# Patient Record
Sex: MALE | Race: BLACK OR AFRICAN AMERICAN | NOT HISPANIC OR LATINO | Employment: UNEMPLOYED | URBAN - METROPOLITAN AREA
[De-identification: names, ages, dates, MRNs, and addresses within clinical notes are randomized per-mention and may not be internally consistent; named-entity substitution may affect disease eponyms.]

---

## 2018-02-01 ENCOUNTER — ANESTHESIA EVENT (OUTPATIENT)
Dept: SURGERY | Facility: OTHER | Age: 53
End: 2018-02-01

## 2018-02-01 ENCOUNTER — HOSPITAL ENCOUNTER (OUTPATIENT)
Dept: PREADMISSION TESTING | Facility: OTHER | Age: 53
Discharge: HOME | End: 2018-02-01
Attending: PLASTIC SURGERY
Payer: MEDICAID

## 2018-02-01 VITALS
DIASTOLIC BLOOD PRESSURE: 78 MMHG | HEIGHT: 71 IN | BODY MASS INDEX: 24.19 KG/M2 | OXYGEN SATURATION: 98 % | SYSTOLIC BLOOD PRESSURE: 110 MMHG | HEART RATE: 78 BPM | WEIGHT: 172.81 LBS | TEMPERATURE: 98 F

## 2018-02-01 LAB
ABO + RH BLD: NORMAL
ANION GAP SERPL CALC-SCNC: 8 MMOL/L
BASOPHILS # BLD AUTO: 0.02 K/UL
BASOPHILS NFR BLD: 0.6 %
BLD GP AB SCN CELLS X3 SERPL QL: NORMAL
BUN SERPL-MCNC: 8 MG/DL
CALCIUM SERPL-MCNC: 9.2 MG/DL
CHLORIDE SERPL-SCNC: 104 MMOL/L
CO2 SERPL-SCNC: 27 MMOL/L
CREAT SERPL-MCNC: 0.7 MG/DL
DIFFERENTIAL METHOD: ABNORMAL
EOSINOPHIL # BLD AUTO: 0.1 K/UL
EOSINOPHIL NFR BLD: 4 %
ERYTHROCYTE [DISTWIDTH] IN BLOOD BY AUTOMATED COUNT: 14.8 %
EST. GFR  (AFRICAN AMERICAN): >60 ML/MIN/1.73 M^2
EST. GFR  (NON AFRICAN AMERICAN): >60 ML/MIN/1.73 M^2
GLUCOSE SERPL-MCNC: 74 MG/DL
HCT VFR BLD AUTO: 41.3 %
HGB BLD-MCNC: 13.2 G/DL
LYMPHOCYTES # BLD AUTO: 1.7 K/UL
LYMPHOCYTES NFR BLD: 49.1 %
MCH RBC QN AUTO: 27.6 PG
MCHC RBC AUTO-ENTMCNC: 32 G/DL
MCV RBC AUTO: 86 FL
MONOCYTES # BLD AUTO: 0.3 K/UL
MONOCYTES NFR BLD: 7.8 %
NEUTROPHILS # BLD AUTO: 1.3 K/UL
NEUTROPHILS NFR BLD: 38.2 %
PLATELET # BLD AUTO: 213 K/UL
PMV BLD AUTO: 10.4 FL
POTASSIUM SERPL-SCNC: 4.2 MMOL/L
RBC # BLD AUTO: 4.78 M/UL
SODIUM SERPL-SCNC: 139 MMOL/L
WBC # BLD AUTO: 3.46 K/UL

## 2018-02-01 PROCEDURE — 86901 BLOOD TYPING SEROLOGIC RH(D): CPT

## 2018-02-01 PROCEDURE — 36415 COLL VENOUS BLD VENIPUNCTURE: CPT

## 2018-02-01 PROCEDURE — 80048 BASIC METABOLIC PNL TOTAL CA: CPT

## 2018-02-01 PROCEDURE — 85025 COMPLETE CBC W/AUTO DIFF WBC: CPT

## 2018-02-01 RX ORDER — LIDOCAINE HYDROCHLORIDE 10 MG/ML
0.5 INJECTION, SOLUTION EPIDURAL; INFILTRATION; INTRACAUDAL; PERINEURAL ONCE
Status: CANCELLED | OUTPATIENT
Start: 2018-02-01 | End: 2018-02-01

## 2018-02-01 RX ORDER — SODIUM CHLORIDE, SODIUM LACTATE, POTASSIUM CHLORIDE, CALCIUM CHLORIDE 600; 310; 30; 20 MG/100ML; MG/100ML; MG/100ML; MG/100ML
INJECTION, SOLUTION INTRAVENOUS CONTINUOUS
Status: CANCELLED | OUTPATIENT
Start: 2018-02-01

## 2018-02-01 NOTE — ANESTHESIA PREPROCEDURE EVALUATION
02/01/2018  Timmy Domínguez is a 53 y.o., male.    Pre-op Assessment    I have reviewed the Patient Summary Reports.     I have reviewed the Nursing Notes.   I have reviewed the Medications.     Review of Systems  Anesthesia Hx:  Denies Family Hx of Anesthesia complications.   Denies Personal Hx of Anesthesia complications.   Social:  Non-Smoker    Hematology/Oncology:  Hematology Normal   Oncology Normal     EENT/Dental:   chronic allergic rhinitis   Cardiovascular:  Cardiovascular Normal Exercise tolerance: good     Pulmonary:  Pulmonary Normal    Renal/:  Renal/ Normal     Hepatic/GI:  Hepatic/GI Normal    Musculoskeletal:  Musculoskeletal Normal    Neurological:  Neurology Normal    Endocrine:  Endocrine Normal    Dermatological:  Skin Normal    Psych:  Psychiatric Normal           Physical Exam  General:  Well nourished    Airway/Jaw/Neck:  Airway Findings: Mouth Opening: Normal Tongue: Normal  General Airway Assessment: Adult  Mallampati: II  TM Distance: Normal, at least 6 cm      Dental:  Dental Findings: In tact, molar caps        Mental Status:  Mental Status Findings:  Alert and Oriented         Anesthesia Plan  Type of Anesthesia, risks & benefits discussed:  Anesthesia Type:  general  Patient's Preference:   Intra-op Monitoring Plan: standard ASA monitors  Intra-op Monitoring Plan Comments:   Post Op Pain Control Plan: multimodal analgesia  Post Op Pain Control Plan Comments:   Induction:   IV  Beta Blocker:         Informed Consent: Patient understands risks and agrees with Anesthesia plan.  Questions answered. Anesthesia consent signed with patient.  ASA Score: 1     Day of Surgery Review of History & Physical:    H&P update referred to the surgeon.

## 2018-02-01 NOTE — DISCHARGE INSTRUCTIONS
PRE-ADMIT TESTING -  [phone]    [address]  [place] [facility]          Your surgery has been scheduled at [facility]. We are pleased to have the opportunity to serve you. For Further Information please call [phone].    On the day of surgery please report to the Information Desk on the 1st floor.    · CONTACT YOUR PHYSICIAN'S OFFICE THE DAY PRIOR TO YOUR SURGERY TO OBTAIN YOUR ARRIVAL TIME.     · The evening before surgery do not eat anything after 9 p.m. ( this includes hard candy, chewing gum and mints).  You may only have GATORADE, POWERADE AND WATER  from 9 p.m. until you leave your home.   DO NOT DRINK ANY LIQUIDS ON THE WAY TO THE HOSPITAL.      SPECIAL MEDICATION INSTRUCTIONS: TAKE medications checked off by the Anesthesiologist on your Medication List.    Angiogram Patients: Take medications as instructed by your physician, including aspirin.     Surgery Patients:    If you take ASPIRIN - Your PHYSICIAN/SURGEON will need to inform you IF/OR when you need to stop taking aspirin prior to your surgery.     Do Not take any medications containing IBUPROFEN.  Do Not Wear any make-up or dark nail polish   (especially eye make-up) to surgery. If you come to surgery with makeup on you will be required to remove the makeup or nail polish.    Do not shave your surgical area at least 5 days prior to your surgery. The surgical prep will be performed at the hospital according to Infection Control regulations.    Leave all valuables at home.   Do Not wear any jewelry or watches, including any metal in body piercings.  Contact Lens must be removed before surgery. Either do not wear the contact lens or bring a case and solution for storage.  Please bring a container for eyeglasses or dentures as required.  Bring any paperwork your physician has provided, such as consent forms,  history and physicals, doctor's orders, etc.   Bring comfortable clothes that are loose fitting to wear upon  discharge. Take into consideration the type of surgery being performed.  Maintain your diet as advised per your physician the day prior to surgery.      Adequate rest the night before surgery is advised.   Park in the Parking lot behind the hospital or in the [facility] across the street from the parking lot. Parking is complimentary.  If you will be discharged the same day as your procedure, please arrange for a responsible adult to drive you home or to accompany you if traveling by taxi.   YOU WILL NOT BE PERMITTED TO DRIVE OR TO LEAVE THE HOSPITAL ALONE AFTER SURGERY.   It is strongly recommended that you arrange for someone to remain with you for the first 24 hrs following your surgery.       Thank you for your cooperation.  The Staff of [facility].        Bathing Instructions                                                                 Please shower the evening before and morning of your procedure with    ANTIBACTERIAL SOAP. ( DIAL, etc )  Concentrate on the surgical area   for at least 3 minutes and rinse completely. Dry off as usual.   Do not use any deodorant, powder, body lotions, perfume, after shave or    cologne.

## 2018-03-07 ENCOUNTER — ANESTHESIA (OUTPATIENT)
Dept: SURGERY | Facility: OTHER | Age: 53
End: 2018-03-07

## 2018-04-25 ENCOUNTER — ANESTHESIA (OUTPATIENT)
Dept: SURGERY | Facility: OTHER | Age: 53
DRG: 517 | End: 2018-04-25
Payer: MEDICAID

## 2018-04-25 ENCOUNTER — ANESTHESIA EVENT (OUTPATIENT)
Dept: SURGERY | Facility: OTHER | Age: 53
DRG: 517 | End: 2018-04-25
Payer: MEDICAID

## 2018-04-25 ENCOUNTER — HOSPITAL ENCOUNTER (INPATIENT)
Facility: OTHER | Age: 53
LOS: 2 days | Discharge: HOME | DRG: 517 | End: 2018-04-27
Attending: PLASTIC SURGERY | Admitting: PLASTIC SURGERY
Payer: MEDICAID

## 2018-04-25 DIAGNOSIS — S06.2X0A: ICD-10-CM

## 2018-04-25 DIAGNOSIS — S01.90XA: ICD-10-CM

## 2018-04-25 PROCEDURE — 37000009 HC ANESTHESIA EA ADD 15 MINS: Performed by: PLASTIC SURGERY

## 2018-04-25 PROCEDURE — 63600175 PHARM REV CODE 636 W HCPCS: Performed by: PLASTIC SURGERY

## 2018-04-25 PROCEDURE — 36000707: Performed by: PLASTIC SURGERY

## 2018-04-25 PROCEDURE — 94761 N-INVAS EAR/PLS OXIMETRY MLT: CPT

## 2018-04-25 PROCEDURE — C1729 CATH, DRAINAGE: HCPCS | Performed by: PLASTIC SURGERY

## 2018-04-25 PROCEDURE — 27201423 OPTIME MED/SURG SUP & DEVICES STERILE SUPPLY: Performed by: PLASTIC SURGERY

## 2018-04-25 PROCEDURE — 25000003 PHARM REV CODE 250: Performed by: STUDENT IN AN ORGANIZED HEALTH CARE EDUCATION/TRAINING PROGRAM

## 2018-04-25 PROCEDURE — 25000003 PHARM REV CODE 250: Performed by: NURSE ANESTHETIST, CERTIFIED REGISTERED

## 2018-04-25 PROCEDURE — 25000003 PHARM REV CODE 250: Performed by: PLASTIC SURGERY

## 2018-04-25 PROCEDURE — 25000003 PHARM REV CODE 250: Performed by: ANESTHESIOLOGY

## 2018-04-25 PROCEDURE — 71000039 HC RECOVERY, EACH ADD'L HOUR: Performed by: PLASTIC SURGERY

## 2018-04-25 PROCEDURE — 63600175 PHARM REV CODE 636 W HCPCS: Performed by: ANESTHESIOLOGY

## 2018-04-25 PROCEDURE — 11000001 HC ACUTE MED/SURG PRIVATE ROOM

## 2018-04-25 PROCEDURE — 37000008 HC ANESTHESIA 1ST 15 MINUTES: Performed by: PLASTIC SURGERY

## 2018-04-25 PROCEDURE — 63600175 PHARM REV CODE 636 W HCPCS: Performed by: NURSE ANESTHETIST, CERTIFIED REGISTERED

## 2018-04-25 PROCEDURE — 27800903 OPTIME MED/SURG SUP & DEVICES OTHER IMPLANTS: Performed by: PLASTIC SURGERY

## 2018-04-25 PROCEDURE — 36000706: Performed by: PLASTIC SURGERY

## 2018-04-25 PROCEDURE — 71000033 HC RECOVERY, INTIAL HOUR: Performed by: PLASTIC SURGERY

## 2018-04-25 PROCEDURE — 0NU10JZ SUPPLEMENT FRONTAL BONE WITH SYNTHETIC SUBSTITUTE, OPEN APPROACH: ICD-10-PCS | Performed by: PLASTIC SURGERY

## 2018-04-25 PROCEDURE — 0NQ10ZZ REPAIR FRONTAL BONE, OPEN APPROACH: ICD-10-PCS | Performed by: PLASTIC SURGERY

## 2018-04-25 DEVICE — PUTTY DBX 5CC: Type: IMPLANTABLE DEVICE | Site: KNEE | Status: FUNCTIONAL

## 2018-04-25 RX ORDER — LIDOCAINE HYDROCHLORIDE AND EPINEPHRINE 10; 10 MG/ML; UG/ML
INJECTION, SOLUTION INFILTRATION; PERINEURAL
Status: DISCONTINUED | OUTPATIENT
Start: 2018-04-25 | End: 2018-04-25 | Stop reason: HOSPADM

## 2018-04-25 RX ORDER — FENTANYL CITRATE 50 UG/ML
25 INJECTION, SOLUTION INTRAMUSCULAR; INTRAVENOUS EVERY 5 MIN PRN
Status: DISCONTINUED | OUTPATIENT
Start: 2018-04-25 | End: 2018-04-25 | Stop reason: HOSPADM

## 2018-04-25 RX ORDER — HYDROMORPHONE HYDROCHLORIDE 2 MG/ML
0.4 INJECTION, SOLUTION INTRAMUSCULAR; INTRAVENOUS; SUBCUTANEOUS EVERY 5 MIN PRN
Status: DISCONTINUED | OUTPATIENT
Start: 2018-04-25 | End: 2018-04-25 | Stop reason: HOSPADM

## 2018-04-25 RX ORDER — HYDROCODONE BITARTRATE AND ACETAMINOPHEN 5; 325 MG/1; MG/1
1 TABLET ORAL EVERY 4 HOURS PRN
Status: DISCONTINUED | OUTPATIENT
Start: 2018-04-25 | End: 2018-04-27 | Stop reason: HOSPADM

## 2018-04-25 RX ORDER — NEOSTIGMINE METHYLSULFATE 1 MG/ML
INJECTION, SOLUTION INTRAVENOUS
Status: DISCONTINUED | OUTPATIENT
Start: 2018-04-25 | End: 2018-04-25

## 2018-04-25 RX ORDER — LIDOCAINE HCL/PF 100 MG/5ML
SYRINGE (ML) INTRAVENOUS
Status: DISCONTINUED | OUTPATIENT
Start: 2018-04-25 | End: 2018-04-25

## 2018-04-25 RX ORDER — SODIUM CHLORIDE 0.9 % (FLUSH) 0.9 %
3 SYRINGE (ML) INJECTION
Status: DISCONTINUED | OUTPATIENT
Start: 2018-04-25 | End: 2018-04-27 | Stop reason: HOSPADM

## 2018-04-25 RX ORDER — HYDROCODONE BITARTRATE AND ACETAMINOPHEN 10; 325 MG/1; MG/1
1 TABLET ORAL EVERY 4 HOURS PRN
Status: DISCONTINUED | OUTPATIENT
Start: 2018-04-25 | End: 2018-04-27 | Stop reason: HOSPADM

## 2018-04-25 RX ORDER — PROPOFOL 10 MG/ML
VIAL (ML) INTRAVENOUS
Status: DISCONTINUED | OUTPATIENT
Start: 2018-04-25 | End: 2018-04-25

## 2018-04-25 RX ORDER — ONDANSETRON 8 MG/1
8 TABLET, ORALLY DISINTEGRATING ORAL EVERY 8 HOURS PRN
Status: DISCONTINUED | OUTPATIENT
Start: 2018-04-25 | End: 2018-04-27 | Stop reason: HOSPADM

## 2018-04-25 RX ORDER — ROCURONIUM BROMIDE 10 MG/ML
INJECTION, SOLUTION INTRAVENOUS
Status: DISCONTINUED | OUTPATIENT
Start: 2018-04-25 | End: 2018-04-25

## 2018-04-25 RX ORDER — KETOROLAC TROMETHAMINE 30 MG/ML
INJECTION, SOLUTION INTRAMUSCULAR; INTRAVENOUS
Status: DISCONTINUED | OUTPATIENT
Start: 2018-04-25 | End: 2018-04-25

## 2018-04-25 RX ORDER — DEXAMETHASONE SODIUM PHOSPHATE 4 MG/ML
INJECTION, SOLUTION INTRA-ARTICULAR; INTRALESIONAL; INTRAMUSCULAR; INTRAVENOUS; SOFT TISSUE
Status: DISCONTINUED | OUTPATIENT
Start: 2018-04-25 | End: 2018-04-25

## 2018-04-25 RX ORDER — ONDANSETRON 2 MG/ML
4 INJECTION INTRAMUSCULAR; INTRAVENOUS DAILY PRN
Status: DISCONTINUED | OUTPATIENT
Start: 2018-04-25 | End: 2018-04-25 | Stop reason: HOSPADM

## 2018-04-25 RX ORDER — SODIUM CHLORIDE 0.9 % (FLUSH) 0.9 %
3 SYRINGE (ML) INJECTION
Status: DISCONTINUED | OUTPATIENT
Start: 2018-04-25 | End: 2018-04-25

## 2018-04-25 RX ORDER — GLYCOPYRROLATE 0.2 MG/ML
INJECTION INTRAMUSCULAR; INTRAVENOUS
Status: DISCONTINUED | OUTPATIENT
Start: 2018-04-25 | End: 2018-04-25

## 2018-04-25 RX ORDER — FENTANYL CITRATE 50 UG/ML
INJECTION, SOLUTION INTRAMUSCULAR; INTRAVENOUS
Status: DISCONTINUED | OUTPATIENT
Start: 2018-04-25 | End: 2018-04-25

## 2018-04-25 RX ORDER — ONDANSETRON 2 MG/ML
INJECTION INTRAMUSCULAR; INTRAVENOUS
Status: DISCONTINUED | OUTPATIENT
Start: 2018-04-25 | End: 2018-04-25

## 2018-04-25 RX ORDER — IBUPROFEN 400 MG/1
400 TABLET ORAL EVERY 6 HOURS PRN
Status: DISCONTINUED | OUTPATIENT
Start: 2018-04-25 | End: 2018-04-27 | Stop reason: HOSPADM

## 2018-04-25 RX ORDER — MEPERIDINE HYDROCHLORIDE 50 MG/ML
12.5 INJECTION INTRAMUSCULAR; INTRAVENOUS; SUBCUTANEOUS ONCE AS NEEDED
Status: DISCONTINUED | OUTPATIENT
Start: 2018-04-25 | End: 2018-04-25 | Stop reason: HOSPADM

## 2018-04-25 RX ORDER — IBUPROFEN 600 MG/1
600 TABLET ORAL EVERY 6 HOURS PRN
Status: DISCONTINUED | OUTPATIENT
Start: 2018-04-25 | End: 2018-04-27 | Stop reason: HOSPADM

## 2018-04-25 RX ORDER — SODIUM CHLORIDE, SODIUM LACTATE, POTASSIUM CHLORIDE, CALCIUM CHLORIDE 600; 310; 30; 20 MG/100ML; MG/100ML; MG/100ML; MG/100ML
INJECTION, SOLUTION INTRAVENOUS CONTINUOUS PRN
Status: DISCONTINUED | OUTPATIENT
Start: 2018-04-25 | End: 2018-04-25

## 2018-04-25 RX ORDER — OXYCODONE HYDROCHLORIDE 5 MG/1
5 TABLET ORAL
Status: DISCONTINUED | OUTPATIENT
Start: 2018-04-25 | End: 2018-04-25 | Stop reason: HOSPADM

## 2018-04-25 RX ORDER — MIDAZOLAM HYDROCHLORIDE 1 MG/ML
INJECTION INTRAMUSCULAR; INTRAVENOUS
Status: DISCONTINUED | OUTPATIENT
Start: 2018-04-25 | End: 2018-04-25

## 2018-04-25 RX ORDER — ACETAMINOPHEN 10 MG/ML
INJECTION, SOLUTION INTRAVENOUS
Status: DISCONTINUED | OUTPATIENT
Start: 2018-04-25 | End: 2018-04-25

## 2018-04-25 RX ORDER — CEFAZOLIN SODIUM 2 G/50ML
2 SOLUTION INTRAVENOUS
Status: COMPLETED | OUTPATIENT
Start: 2018-04-25 | End: 2018-04-25

## 2018-04-25 RX ADMIN — HYDROMORPHONE HYDROCHLORIDE 0.4 MG: 2 INJECTION INTRAMUSCULAR; INTRAVENOUS; SUBCUTANEOUS at 01:04

## 2018-04-25 RX ADMIN — NEOSTIGMINE METHYLSULFATE 4 MG: 1 INJECTION INTRAVENOUS at 11:04

## 2018-04-25 RX ADMIN — HYDROCODONE BITARTRATE AND ACETAMINOPHEN 1 TABLET: 10; 325 TABLET ORAL at 04:04

## 2018-04-25 RX ADMIN — ACETAMINOPHEN 1000 MG: 10 INJECTION, SOLUTION INTRAVENOUS at 10:04

## 2018-04-25 RX ADMIN — FENTANYL CITRATE 100 MCG: 50 INJECTION, SOLUTION INTRAMUSCULAR; INTRAVENOUS at 10:04

## 2018-04-25 RX ADMIN — PROPOFOL 180 MG: 10 INJECTION, EMULSION INTRAVENOUS at 10:04

## 2018-04-25 RX ADMIN — ROCURONIUM BROMIDE 50 MG: 10 INJECTION, SOLUTION INTRAVENOUS at 10:04

## 2018-04-25 RX ADMIN — CARBOXYMETHYLCELLULOSE SODIUM 2 DROP: 2.5 SOLUTION/ DROPS OPHTHALMIC at 10:04

## 2018-04-25 RX ADMIN — FENTANYL CITRATE 50 MCG: 50 INJECTION, SOLUTION INTRAMUSCULAR; INTRAVENOUS at 10:04

## 2018-04-25 RX ADMIN — OXYCODONE HYDROCHLORIDE 5 MG: 5 TABLET ORAL at 01:04

## 2018-04-25 RX ADMIN — SODIUM CHLORIDE, SODIUM LACTATE, POTASSIUM CHLORIDE, AND CALCIUM CHLORIDE: 600; 310; 30; 20 INJECTION, SOLUTION INTRAVENOUS at 11:04

## 2018-04-25 RX ADMIN — HYDROMORPHONE HYDROCHLORIDE 0.4 MG: 2 INJECTION INTRAMUSCULAR; INTRAVENOUS; SUBCUTANEOUS at 12:04

## 2018-04-25 RX ADMIN — DEXAMETHASONE SODIUM PHOSPHATE 8 MG: 4 INJECTION, SOLUTION INTRAMUSCULAR; INTRAVENOUS at 10:04

## 2018-04-25 RX ADMIN — KETOROLAC TROMETHAMINE 30 MG: 30 INJECTION, SOLUTION INTRAMUSCULAR; INTRAVENOUS at 12:04

## 2018-04-25 RX ADMIN — LIDOCAINE HYDROCHLORIDE 100 MG: 20 INJECTION, SOLUTION INTRAVENOUS at 10:04

## 2018-04-25 RX ADMIN — SODIUM CHLORIDE, SODIUM LACTATE, POTASSIUM CHLORIDE, AND CALCIUM CHLORIDE: 600; 310; 30; 20 INJECTION, SOLUTION INTRAVENOUS at 09:04

## 2018-04-25 RX ADMIN — GLYCOPYRROLATE 0.8 MG: 0.2 INJECTION, SOLUTION INTRAMUSCULAR; INTRAVENOUS at 11:04

## 2018-04-25 RX ADMIN — MIDAZOLAM HYDROCHLORIDE 2 MG: 1 INJECTION, SOLUTION INTRAMUSCULAR; INTRAVENOUS at 10:04

## 2018-04-25 RX ADMIN — PROPOFOL 100 MG: 10 INJECTION, EMULSION INTRAVENOUS at 10:04

## 2018-04-25 RX ADMIN — HYDROCODONE BITARTRATE AND ACETAMINOPHEN 1 TABLET: 10; 325 TABLET ORAL at 08:04

## 2018-04-25 RX ADMIN — CEFAZOLIN SODIUM 2 G: 2 SOLUTION INTRAVENOUS at 10:04

## 2018-04-25 RX ADMIN — ONDANSETRON 4 MG: 2 INJECTION INTRAMUSCULAR; INTRAVENOUS at 11:04

## 2018-04-25 NOTE — ANESTHESIA PREPROCEDURE EVALUATION
04/25/2018  Timmy Domínguez is a 53 y.o., male.    Anesthesia Evaluation    I have reviewed the Patient Summary Reports.    I have reviewed the Nursing Notes.   I have reviewed the Medications.     Review of Systems  Anesthesia Hx:  Denies Family Hx of Anesthesia complications.   Denies Personal Hx of Anesthesia complications.   Social:  Non-Smoker    Hematology/Oncology:  Hematology Normal   Oncology Normal     EENT/Dental:   chronic allergic rhinitis   Cardiovascular:  Cardiovascular Normal Exercise tolerance: good     Pulmonary:  Pulmonary Normal    Renal/:  Renal/ Normal     Hepatic/GI:  Hepatic/GI Normal    Musculoskeletal:  Musculoskeletal Normal    Neurological:   S/p crani for trauma   Endocrine:  Endocrine Normal    Dermatological:  Skin Normal    Psych:  Psychiatric Normal           Physical Exam  General:  Well nourished    Airway/Jaw/Neck:  Airway Findings: Mouth Opening: Normal Tongue: Normal  General Airway Assessment: Adult  Mallampati: II  TM Distance: Normal, at least 6 cm      Dental:  Dental Findings: In tact, molar caps        Mental Status:  Mental Status Findings:  Alert and Oriented         Anesthesia Plan  Type of Anesthesia, risks & benefits discussed:  Anesthesia Type:  general  Patient's Preference:   Intra-op Monitoring Plan: standard ASA monitors  Intra-op Monitoring Plan Comments:   Post Op Pain Control Plan: multimodal analgesia  Post Op Pain Control Plan Comments:   Induction:   IV  Beta Blocker:         Informed Consent: Patient understands risks and agrees with Anesthesia plan.  Questions answered. Anesthesia consent signed with patient.  ASA Score: 2     Day of Surgery Review of History & Physical:    H&P update referred to the surgeon.         Ready For Surgery From Anesthesia Perspective.

## 2018-04-25 NOTE — ANESTHESIA POSTPROCEDURE EVALUATION
"Anesthesia Post Evaluation    Patient: Timmy Domínguez    Procedure(s) Performed: Procedure(s) (LRB):  RECONSTRUCTION FACIAL BONE (N/A)    Final Anesthesia Type: general  Patient location during evaluation: PACU  Patient participation: Yes- Able to Participate  Level of consciousness: awake and alert  Post-procedure vital signs: reviewed and stable  Pain management: adequate  Airway patency: patent  PONV status at discharge: No PONV  Anesthetic complications: no      Cardiovascular status: blood pressure returned to baseline  Respiratory status: unassisted and spontaneous ventilation  Hydration status: euvolemic  Follow-up not needed.        Visit Vitals  BP (!) 142/79 (BP Location: Left arm, Patient Position: Lying)   Pulse 98   Temp 37.2 °C (98.9 °F) (Oral)   Resp 16   Ht 5' 11" (1.803 m)   Wt 78.4 kg (172 lb 13.5 oz)   SpO2 100%   BMI 24.11 kg/m²       Pain/Fernanda Score: Pain Assessment Performed: Yes (4/25/2018 12:29 PM)  Presence of Pain: non-verbal indicators absent (4/25/2018 12:29 PM)  Fernanda Score: 8 (4/25/2018 12:29 PM)      "

## 2018-04-25 NOTE — PLAN OF CARE
Patient prefers to have *to have no one  present for discharge teaching.  Patient did provide Aunt's phone number, but would only like her to be called if absolutely necessary

## 2018-04-25 NOTE — PLAN OF CARE
Patient arrived early for surgery due to transportation issues -surgery is not scheduled until 2:30p.  Placed in a room and pre op complete.  Patient is aware that he will be waiting.  Instructed on use of call light.  States that he understands he will have to wait.

## 2018-04-25 NOTE — INTERVAL H&P NOTE
The patient has been examined and the H&P has been reviewed:    I concur with the findings and no changes have occurred since H&P was written.    Anesthesia/Surgery risks, benefits and alternative options discussed and understood by patient/family.          Active Hospital Problems    Diagnosis  POA    Contusion of cortex of right cerebral hemisphere with open intracranial wound without loss of consciousness [S06.310A, S01.90XA]  Yes      Resolved Hospital Problems    Diagnosis Date Resolved POA   No resolved problems to display.

## 2018-04-25 NOTE — BRIEF OP NOTE
[facility]  Brief Operative Note    SUMMARY     Surgery Date: 4/25/2018     Surgeon(s) and Role:     * Royer Nj MD - Primary    Assisting Surgeon: None    Pre-op Diagnosis:  Contusion of cortex of right cerebral hemisphere with open intracranial wound without loss of consciousness, initial encounter [S06.310A, S01.90XA]    Post-op Diagnosis:  Post-Op Diagnosis Codes:     * Contusion of cortex of right cerebral hemisphere with open intracranial wound without loss of consciousness, initial encounter [S06.310A, S01.90XA]    Procedure(s) (LRB):  RECONSTRUCTION FACIAL BONE (N/A)    Anesthesia: General    Description of Procedure: cranioplasty, contouring of fibula flap     Description of the findings of the procedure: see op note    Estimated Blood Loss: * No values recorded between 4/25/2018 10:38 AM and 4/25/2018 12:14 PM *         Specimens:   Specimen (12h ago through future)    None

## 2018-04-25 NOTE — TRANSFER OF CARE
"Anesthesia Transfer of Care Note    Patient: Timmy Domínguez    Procedure(s) Performed: Procedure(s) (LRB):  RECONSTRUCTION FACIAL BONE (N/A)    Patient location: PACU    Anesthesia Type: general    Transport from OR: Transported from OR on 2-3 L/min O2 by NC with adequate spontaneous ventilation    Post pain: adequate analgesia    Post assessment: no apparent anesthetic complications and tolerated procedure well    Post vital signs: stable    Level of consciousness: awake, alert and oriented    Nausea/Vomiting: no nausea/vomiting    Complications: none    Transfer of care protocol was followed      Last vitals:   Visit Vitals  /77 (BP Location: Left arm, Patient Position: Sitting)   Pulse 88   Temp 36.4 °C (97.5 °F) (Oral)   Resp 18   Ht 5' 11" (1.803 m)   Wt 78.4 kg (172 lb 13.5 oz)   SpO2 99%   BMI 24.11 kg/m²     "

## 2018-04-26 PROCEDURE — 97802 MEDICAL NUTRITION INDIV IN: CPT

## 2018-04-26 PROCEDURE — 94761 N-INVAS EAR/PLS OXIMETRY MLT: CPT

## 2018-04-26 PROCEDURE — 25000003 PHARM REV CODE 250: Performed by: STUDENT IN AN ORGANIZED HEALTH CARE EDUCATION/TRAINING PROGRAM

## 2018-04-26 PROCEDURE — 11000001 HC ACUTE MED/SURG PRIVATE ROOM

## 2018-04-26 RX ADMIN — HYDROCODONE BITARTRATE AND ACETAMINOPHEN 1 TABLET: 10; 325 TABLET ORAL at 09:04

## 2018-04-26 RX ADMIN — HYDROCODONE BITARTRATE AND ACETAMINOPHEN 1 TABLET: 10; 325 TABLET ORAL at 12:04

## 2018-04-26 RX ADMIN — HYDROCODONE BITARTRATE AND ACETAMINOPHEN 1 TABLET: 10; 325 TABLET ORAL at 01:04

## 2018-04-26 RX ADMIN — HYDROCODONE BITARTRATE AND ACETAMINOPHEN 1 TABLET: 10; 325 TABLET ORAL at 06:04

## 2018-04-26 RX ADMIN — HYDROCODONE BITARTRATE AND ACETAMINOPHEN 1 TABLET: 10; 325 TABLET ORAL at 05:04

## 2018-04-26 RX ADMIN — HYDROCODONE BITARTRATE AND ACETAMINOPHEN 1 TABLET: 10; 325 TABLET ORAL at 10:04

## 2018-04-26 NOTE — PLAN OF CARE
Problem: Patient Care Overview  Goal: Plan of Care Review  Outcome: Ongoing (interventions implemented as appropriate)  Plan of care reviewed with patient, updated the plan according to todays needs. Discharge needs assessed, purposeful rounding being performed

## 2018-04-26 NOTE — PLAN OF CARE
Problem: Patient Care Overview  Goal: Plan of Care Review  Outcome: Ongoing (interventions implemented as appropriate)  Recommendation/Intervention:   1. Continue regular diet   2. Encouraged pt to add variety to his diet by including fresh fruits, vegetables, and lean proteins     Goals:   1. Meet >85% of EEN and EPN   2. Prevent >2% wt loss per week   3. Maintain nutrition related labs wnl

## 2018-04-26 NOTE — PLAN OF CARE
Problem: Patient Care Overview  Goal: Plan of Care Review  Outcome: Ongoing (interventions implemented as appropriate)  Patient on room air.  No distress noted.

## 2018-04-26 NOTE — PROGRESS NOTES
" [facility]  Adult Nutrition  Progress Note    SUMMARY       Recommendations    Recommendation/Intervention:   1. Continue regular diet   2. Encouraged pt to add variety to his diet by including fresh fruits, vegetables, and lean proteins    Goals:   1. Meet >85% of EEN and EPN   2. Prevent >2% wt loss per week   3. Maintain nutrition related labs wnl    Nutrition Goal Status: new  Communication of RD Recs: discussed on rounds    Reason for Assessment    Reason for Assessment: identified at risk by screening criteria (MST)  Diagnosis:   1. Contusion of cortex of right cerebral hemisphere with open intracranial wound without loss of consciousness, initial encounter    2. Contusion of cortex of right cerebral hemisphere with open intracranial wound without loss of consciousness      Past Medical History:   Diagnosis Date    Chronic frontal sinusitis     with osteomyelitis     Interdisciplinary Rounds: attended  General Information Comments: Pt is s/p cranioplasty, POD 1, tolerating regular diet. Endorses good appetite, ate 100% of breakfast. Pt endorses intentional 15lb wt loss over last 2 months, has been eating mostly cereal and milk. Encouraged pt to add variety to his diet by including fresh fruits, vegetables, and lean proteins. Pt denies N/V/D/C.  Nutrition Discharge Planning: d/c on regular diet    Nutrition Risk Screen    Nutrition Risk Screen: no indicators present    Nutrition/Diet History    Do you have any cultural, spiritual, Restoration conflicts, given your current situation?: none    Anthropometrics    Temp: 97.9 °F (36.6 °C)  Height Method: Stated  Height: 5' 11" (180.3 cm)  Height (inches): 71 in  Weight Method: Bed Scale  Weight: 78 kg (172 lb)  Weight (lb): 172 lb  Ideal Body Weight (IBW), Male: 172 lb  % Ideal Body Weight, Male (lb): 100 lb  BMI (Calculated): 24  BMI Grade: 18.5-24.9 - normal     Lab/Procedures/Meds    Pertinent Labs Reviewed: reviewed (unremarkable)  Pertinent " Medications Reviewed: reviewed (none relevant)    Physical Findings/Assessment    Overall Physical Appearance: nourished  Oral/Mouth Cavity: WDL  Skin: incision(s)    Estimated/Assessed Needs    Weight Used For Calorie Calculations: 78 kg (171 lb 15.3 oz)  Energy Calorie Requirements (kcal): 1950-2340  Energy Need Method: Kcal/kg (25-30 kcal/kg)  Protein Requirements: 78-94 gm/d (1-1.2 gm/kg)  Weight Used For Protein Calculations: 78 kg (171 lb 15.3 oz)  Fluid Requirements (mL): 1950  Fluid Need Method: RDA Method  RDA Method (mL): 1950    Nutrition Prescription Ordered    Current Diet Order: Regular    Evaluation of Received Nutrient/Fluid Intake    % Intake of Estimated Energy Needs: 75 - 100 %  % Meal Intake: 75 - 100 %    Nutrition Risk    Level of Risk/Frequency of Follow-up: low     Assessment and Plan    No nutrition dx at this time     Monitor and Evaluation    Food and Nutrient Intake: energy intake, food and beverage intake  Food and Nutrient Adminstration: diet order  Knowledge/Beliefs/Attitudes: food and nutrition knowledge/skill, beliefs and attitudes  Physical Activity and Function: nutrition-related ADLs and IADLs  Anthropometric Measurements: weight, weight change  Biochemical Data, Medical Tests and Procedures: electrolyte and renal panel, gastrointestinal profile, glucose/endocrine profile, inflammatory profile  Nutrition-Focused Physical Findings: overall appearance, extremities, muscles and bones, skin     Nutrition Follow-Up    RD Follow-up?: Yes

## 2018-04-26 NOTE — PLAN OF CARE
Problem: Patient Care Overview  Goal: Plan of Care Review  Outcome: Ongoing (interventions implemented as appropriate)  Patient on RA. Sats 99%. Pt. in no distress, will continue to monitor.

## 2018-04-26 NOTE — PLAN OF CARE
Problem: Patient Care Overview  Goal: Plan of Care Review  Outcome: Ongoing (interventions implemented as appropriate)  Patient had uneventful night, no falls, no skin breakdown, no trauma. C/o pain throughout the night, decreased with pain medicine. Dried drainage to dressing. VSS. Purposeful hourly rounding throughout the shift.

## 2018-04-26 NOTE — PLAN OF CARE
SW met with at bedside to complete discharge assessment, Dr. Igor Fuentes [phone], PCP and uses [facility] on [facility] [facility] in [place].  Pt will need Medicaid transportation arranged upon discharge.     04/26/18 0949   Discharge Assessment   Assessment Type Discharge Planning Assessment   Confirmed/corrected address and phone number on facesheet? Yes   Assessment information obtained from? Patient   Communicated expected length of stay with patient/caregiver no   Prior to hospitilization cognitive status: Alert/Oriented   Prior to hospitalization functional status: Independent   Current cognitive status: Alert/Oriented   Current Functional Status: Independent   Lives With alone   Able to Return to Prior Arrangements yes   Is patient able to care for self after discharge? Yes   Patient's perception of discharge disposition home or selfcare   Readmission Within The Last 30 Days no previous admission in last 30 days   Patient currently being followed by outpatient case management? No   Patient currently receives any other outside agency services? No   Equipment Currently Used at Home none   Do you have any problems affording any of your prescribed medications? No   Is the patient taking medications as prescribed? yes   Does the patient have transportation home? Yes   Transportation Available Medicaid transportation   Does the patient receive services at the [facility]? No   Discharge Plan A Home   Patient/Family In Agreement With Plan yes

## 2018-04-26 NOTE — NURSING
No acute events this shift, pt free of injury and falls. Pain is being well controlled with PO medications. Dried drainage to ace wrap, MARLENE drain in place with all drainage documented. VSS. No complaints of N&V. Safety measures ongoing. Will continue to monitor.

## 2018-04-26 NOTE — PROGRESS NOTES
Plastic Surgery Progress Note    HD #  LOS: 1 day   POD #1 Day Post-Op    Subjective: NAEON. Complaining of pain. Tolerated regular diet. Has not ambulated.    Objective:  Vitals:   Temp:  [97.5 °F (36.4 °C)-98.9 °F (37.2 °C)] 97.9 °F (36.6 °C)  Pulse:  [] 85  Resp:  [16-18] 18  SpO2:  [96 %-100 %] 97 %  BP: (122-145)/(64-79) 137/79  I/O last 3 completed shifts:  In: 2600 [I.V.:2600]  Out: 2165 [Urine:2125; Drains:40]  Intake/Output Summary (Last 24 hours) at 04/26/18 0731  Last data filed at 04/26/18 0526   Gross per 24 hour   Intake             2600 ml   Output             2165 ml   Net              435 ml       PE:  Gen: NAD  HEENT: NC/AT, EOMI  Head in wrap, clean and dry  MARLENE with 50 cc serosanguinous output  CV: RRR  Resp: non-labored breathing      A/P: 53 y.o. male  s/p cranioplasty and recontouring frontal bone/ fibula free flap POD 1 doing well  -regular diet  -pain control  - IV Abx  - ambulate  -strict I&Os, keep drain today  -likely discharge tomorrow     Analisa Matos MD  [facility] Plastic Surgery  Pager: [phone]  7:31 AM  4/26/2018

## 2018-04-27 VITALS
BODY MASS INDEX: 24.08 KG/M2 | DIASTOLIC BLOOD PRESSURE: 84 MMHG | HEIGHT: 71 IN | HEART RATE: 82 BPM | WEIGHT: 172 LBS | OXYGEN SATURATION: 97 % | SYSTOLIC BLOOD PRESSURE: 138 MMHG | TEMPERATURE: 99 F | RESPIRATION RATE: 18 BRPM

## 2018-04-27 PROCEDURE — 25000003 PHARM REV CODE 250: Performed by: STUDENT IN AN ORGANIZED HEALTH CARE EDUCATION/TRAINING PROGRAM

## 2018-04-27 RX ORDER — HYDROCODONE BITARTRATE AND ACETAMINOPHEN 5; 325 MG/1; MG/1
1 TABLET ORAL EVERY 4 HOURS PRN
Qty: 30 TABLET | Refills: 0 | Status: SHIPPED | OUTPATIENT
Start: 2018-04-27

## 2018-04-27 RX ORDER — CEPHALEXIN 500 MG/1
500 CAPSULE ORAL EVERY 6 HOURS
Status: DISCONTINUED | OUTPATIENT
Start: 2018-04-27 | End: 2018-04-27 | Stop reason: HOSPADM

## 2018-04-27 RX ORDER — CEPHALEXIN 500 MG/1
500 CAPSULE ORAL EVERY 6 HOURS
Qty: 28 CAPSULE | Refills: 0 | Status: SHIPPED | OUTPATIENT
Start: 2018-04-27 | End: 2018-05-04

## 2018-04-27 RX ADMIN — CEPHALEXIN 500 MG: 500 CAPSULE ORAL at 06:04

## 2018-04-27 RX ADMIN — HYDROCODONE BITARTRATE AND ACETAMINOPHEN 1 TABLET: 5; 325 TABLET ORAL at 02:04

## 2018-04-27 RX ADMIN — HYDROCODONE BITARTRATE AND ACETAMINOPHEN 1 TABLET: 5; 325 TABLET ORAL at 06:04

## 2018-04-27 RX ADMIN — HYDROCODONE BITARTRATE AND ACETAMINOPHEN 1 TABLET: 10; 325 TABLET ORAL at 10:04

## 2018-04-27 NOTE — PROGRESS NOTES
Plastic Surgery Progress Note    HD #  LOS: 2 days   POD #2 Days Post-Op    Subjective: NAEON. Complaining of pain. Tolerated regular diet. Ambulating without difficulty.     Objective:  Vitals:   Temp:  [97.6 °F (36.4 °C)-98.1 °F (36.7 °C)] 98 °F (36.7 °C)  Pulse:  [74-88] 76  Resp:  [18-20] 18  SpO2:  [95 %-100 %] 95 %  BP: (117-138)/(69-89) 138/83  I/O last 3 completed shifts:  In: 3800 [P.O.:1200; I.V.:2600]  Out: 2540 [Urine:2475; Drains:65]    Intake/Output Summary (Last 24 hours) at 04/27/18 0641  Last data filed at 04/27/18 0628   Gross per 24 hour   Intake             3120 ml   Output           2232.5 ml   Net            887.5 ml       PE:  Gen: NAD  HEENT: NC/AT, EOMI  Head in wrap, clean and dry  MARLENE with  33 cc serosanguinous output  CV: RRR  Resp: non-labored breathing      A/P: 53 y.o. male  s/p cranioplasty and recontouring frontal bone/ fibula free flap POD 2 doing well  -regular diet  -pain control  -switch to PO Abx  - ambulate  -strict I&Os, keep drain today  -discharge today     Analisa Matos MD  [facility] Plastic Surgery  Pager: [phone]  7:31 AM  4/27/2018

## 2018-04-27 NOTE — DISCHARGE SUMMARY
[facility]  Discharge Summary  Plastic Surgery      Admit Date: 4/25/2018    Discharge Date and Time:  04/27/2018 11:05 AM    Attending Physician: Royer Nj MD     Discharge Provider: Ilana Matos    Reason for Admission: surgery    Procedures Performed: Procedure(s) (LRB):  RECONSTRUCTION FACIAL BONE (N/A)    Hospital Course (synopsis of major diagnoses, care, treatment, and services provided during the course of the hospital stay): Patient taken to OR for recontouring fibula flap. He had an uneventful post op course, was admitted for 2 nights for IV abx and pain control and was then deemed stable for discharge.    Final Diagnoses:    Principal Problem: Contusion of cortex of right cerebral hemisphere with open intracranial wound without loss of consciousness   Secondary Diagnoses:   Active Hospital Problems    Diagnosis  POA    *Contusion of cortex of right cerebral hemisphere with open intracranial wound without loss of consciousness [S06.310A, S01.90XA]  Yes      Resolved Hospital Problems    Diagnosis Date Resolved POA   No resolved problems to display.       Discharged Condition: good    Disposition: Home or Self Care    Follow Up/Patient Instructions:     Medications:  Reconciled Home Medications:      Medication List      START taking these medications    cephALEXin 500 MG capsule  Commonly known as:  KEFLEX  Take 1 capsule (500 mg total) by mouth every 6 (six) hours.     hydrocodone-acetaminophen 5-325mg 5-325 mg per tablet  Commonly known as:  NORCO  Take 1 tablet by mouth every 4 (four) hours as needed.            Discharge Procedure Orders  Ambulatory referral to Orthopedics   Referral Priority: Routine Referral Type: Consultation   Requested Specialty: Orthopedic Surgery    Number of Visits Requested: 1      Diet Adult Regular     Activity as tolerated     Notify your health care provider if you experience any of the following:  temperature >100.4     Notify your health  care provider if you experience any of the following:  persistent nausea and vomiting or diarrhea     Notify your health care provider if you experience any of the following:  severe uncontrolled pain     Notify your health care provider if you experience any of the following:  redness, tenderness, or signs of infection (pain, swelling, redness, odor or green/yellow discharge around incision site)       Follow-up Information     Royer Brooks MD In 2 weeks.    Specialties:  Plastic Surgery, Oral and Maxillofacial Surgery, Oral Surgery  Contact information:  [address] [place] [address] [phone]

## 2018-04-27 NOTE — PLAN OF CARE
Medically cleared for discharge.    Medicaid transportation arranged for pt.      No further needs from CM perspective.       04/27/18 1448   Final Note   Assessment Type Final Discharge Note   Discharge Disposition Home   Discharge plans and expectations educations in teach back method with documentation complete? Yes   Right Care Referral Info   Post Acute Recommendation No Care

## 2018-04-27 NOTE — NURSING
Discharge paperwork reviewed with patient. Discussed all medications to begin taking and side effects. Discussed woundcare, signs and symptoms of infection and when to seek medical attention. Discussed followup appointments and location of each. Tootie MARTINEZ is working on a medicaid transportation for patient to return home. All of patients questions were answered, handouts were provided. Will continue to monitor until ride gets here.

## 2018-04-27 NOTE — PLAN OF CARE
Problem: Patient Care Overview  Goal: Plan of Care Review  Outcome: Ongoing (interventions implemented as appropriate)  PLAN OF CARE REVIEWED WITH PT.  PT AA+OX4.  ABLE TO MAKE NEEDS KNOWN.  DOES NOT APPEAR TO BE IN ANY DISTRESS.  C/O PAIN.  PAIN MEDICATION GIVEN AS ORDERED.  REQUIRES MODERATE ASSIST WITH ADLS.  CONT. OF B/B.  PT REMAINS FREE FROM FALLS, INJURY, AND TRAUMA.  FALL PRECAUTIONS IN PLACE.  BED IN LOWEST POSITION WITH WHEELS LOCKED.  CALL LIGHT WITHIN REACH.  WILL CONTINUE TO MONITOR.

## 2018-04-27 NOTE — PROGRESS NOTES
JUAN called [facility] transportation [phone], spoke to [place] and arranged discharge pickup with [facility] [employer] [phone], will pickup in 15 minutes/1:35pm.  JUAN informed DUARTE Thompson.15 minutes.

## 2018-04-30 NOTE — OP NOTE
DATE OF PROCEDURE:  04/25/2018    PREOPERATIVE DIAGNOSIS:  Acquired deformity of the head.    POSTOPERATIVE DIAGNOSIS:  Acquired deformity of the head.    PROCEDURES:  1.  Recontouring of facial bone.  2.  Bone graft to the forehead using generalized free strut bone.    SURGEON:  Royer Nj M.D.    ASSISTANT:  Dominic.    ANESTHESIA:  General through oral intubations.    ESTIMATED BLOOD LOSS:  75 mL.    COMPLICATIONS:  None.    DISPOSITION:  The patient tolerated procedure well, emerged from general   anesthesia, was extubated in the Operating Room without complication.    CLINICAL HISTORY:  This 53-year-old male who is status post frontal orbital   reconstruction using a fibular free flap from mucoperiosteal.  He has been doing   well.  The infection has been controlled.  Now, he needs some re-contouring of   the lateral brows prominent from the fibular free flap.    DESCRIPTION OF PROCEDURE:  The patient was taken to the Operating Room and   placed supine on the operating table.  Following induction of general anesthesia   and successful oral endotracheal intubation, the patient was prepped and draped   in a sterile fashion.  Attention was then directed to the scalp where a total   of 20 mL of lidocaine and 1% epinephrine 1:100,000 was injected within the area   of the coronal incision.  The incision was then carried with a #15 blade.    Subperiosteal dissection was performed all the way to the supraorbital rim   bilaterally.  The prominence of the lateral aspect of the fibula on the right   was identified and recontoured using rotary instruments with copious irrigation.    The skin was redraped.  There was good contour.  There was still some   deficiency at the junction of the fibula and native calvarium.  Those areas were   filled with DBX, which provided good contour.  Hemostasis was achieved.  A   large piece of Surgicel was applied and the wound was closed in layers using 3-0   Vicryl and 3-0 Prolene over  drains.  The patient tolerated procedure well,   emerged from general anesthesia, was extubated in the Operating Room without   complication.      HS/IN  dd: 04/30/2018 09:28:32 (CDT)  td: 04/30/2018 13:06:08 (CDT)  Doc ID   #[id number]  Job ID #[id number]    CC:

## 2024-06-05 NOTE — OR NURSING
Sclera bloodshot bilaterally. Pupils pinpoint presrntly. Pt able to state correct birthday and current year and events. Speech slightly slurred at this time. Able to see clock on wall and see correct time. r eyelid droopy compared to left.. Pt is able to raise eyebrows including r  Within constraints of dressing.   Unknown

## (undated) DEVICE — STAPLER SKIN ROTATING HEAD

## (undated) DEVICE — SEE MEDLINE ITEM 152515

## (undated) DEVICE — DRESSING XEROFORM FOIL PK 1X8

## (undated) DEVICE — SYR 30CC LUER LOCK

## (undated) DEVICE — SEE MEDLINE ITEM 157166

## (undated) DEVICE — SEE MEDLINE ITEM 156930

## (undated) DEVICE — SEE MEDLINE ITEM 153151

## (undated) DEVICE — HOOK STAY ELAS 5MM 8EA/PK

## (undated) DEVICE — SPONGE DERMACEA GAUZE 4X4

## (undated) DEVICE — SCALPEL #15 BLADE STRL DISP.

## (undated) DEVICE — SKIN MARKER DEVON 160

## (undated) DEVICE — POSITIONER HEAD DONUT 9IN FOAM

## (undated) DEVICE — SUT ETHILON 3-0 FS-1 30

## (undated) DEVICE — NDL HYPO REG 25G X 1 1/2

## (undated) DEVICE — SPONGE LAP 18X18 PREWASHED

## (undated) DEVICE — NDL N SERIES MICRO-DISSECTION

## (undated) DEVICE — ELECTRODE REM PLYHSV RETURN 9

## (undated) DEVICE — SUT BONE WAX 2.5 GRMS 12/BX

## (undated) DEVICE — POSITIONER IV ARMBOARD FOAM

## (undated) DEVICE — POSITIONER HEEL FOAM CONVOLTD

## (undated) DEVICE — EVACUATOR WOUND BULB 100CC

## (undated) DEVICE — HEMOSTAT SURGICEL 4X8IN

## (undated) DEVICE — SYR 10CC LUER LOCK

## (undated) DEVICE — HEMOSTAT SURGICEL NU-KNIT 6X9

## (undated) DEVICE — SEE MEDLINE ITEM 152622

## (undated) DEVICE — SUT VICRYL PLUS 3-0 SH 18IN

## (undated) DEVICE — SEE MEDLINE ITEM 157110

## (undated) DEVICE — DRAIN FLAT JP 7X4MM FUL

## (undated) DEVICE — DRAPE HEAD/BAR 40 X 27 W/ADH